# Patient Record
Sex: FEMALE | Race: ASIAN | Employment: UNEMPLOYED | URBAN - METROPOLITAN AREA
[De-identification: names, ages, dates, MRNs, and addresses within clinical notes are randomized per-mention and may not be internally consistent; named-entity substitution may affect disease eponyms.]

---

## 2022-05-20 LAB
ANTIBODY SCREEN, EXTERNAL: NEGATIVE
CHLAMYDIA, EXTERNAL: NEGATIVE
HBSAG, EXTERNAL: NEGATIVE
HEPATITIS C AB,   EXT: NEGATIVE
HIV, EXTERNAL: NORMAL
N. GONORRHEA, EXTERNAL: NEGATIVE
RUBELLA, EXTERNAL: NORMAL
TYPE, ABO & RH, EXTERNAL: NORMAL

## 2022-11-28 LAB — GRBS, EXTERNAL: NEGATIVE

## 2022-12-19 ENCOUNTER — HOSPITAL ENCOUNTER (INPATIENT)
Age: 32
LOS: 4 days | Discharge: HOME | End: 2022-12-23
Attending: OBSTETRICS & GYNECOLOGY | Admitting: STUDENT IN AN ORGANIZED HEALTH CARE EDUCATION/TRAINING PROGRAM
Payer: COMMERCIAL

## 2022-12-19 PROBLEM — Z34.90 PREGNANCY: Status: ACTIVE | Noted: 2022-12-19

## 2022-12-19 LAB
BASOPHILS # BLD: 0 K/UL (ref 0–0.1)
BASOPHILS NFR BLD: 0 % (ref 0–1)
DIFFERENTIAL METHOD BLD: ABNORMAL
EOSINOPHIL # BLD: 0.5 K/UL (ref 0–0.4)
EOSINOPHIL NFR BLD: 5 % (ref 0–7)
ERYTHROCYTE [DISTWIDTH] IN BLOOD BY AUTOMATED COUNT: 13.8 % (ref 11.5–14.5)
HCT VFR BLD AUTO: 35.5 % (ref 35–47)
HGB BLD-MCNC: 11.8 G/DL (ref 11.5–16)
IMM GRANULOCYTES # BLD AUTO: 0.1 K/UL (ref 0–0.04)
IMM GRANULOCYTES NFR BLD AUTO: 1 % (ref 0–0.5)
LYMPHOCYTES # BLD: 2.1 K/UL (ref 0.8–3.5)
LYMPHOCYTES NFR BLD: 20 % (ref 12–49)
MCH RBC QN AUTO: 30.1 PG (ref 26–34)
MCHC RBC AUTO-ENTMCNC: 33.2 G/DL (ref 30–36.5)
MCV RBC AUTO: 90.6 FL (ref 80–99)
MONOCYTES # BLD: 0.8 K/UL (ref 0–1)
MONOCYTES NFR BLD: 8 % (ref 5–13)
NEUTS SEG # BLD: 7.3 K/UL (ref 1.8–8)
NEUTS SEG NFR BLD: 66 % (ref 32–75)
NRBC # BLD: 0 K/UL (ref 0–0.01)
NRBC BLD-RTO: 0 PER 100 WBC
PLATELET # BLD AUTO: 210 K/UL (ref 150–400)
PMV BLD AUTO: 10.4 FL (ref 8.9–12.9)
RBC # BLD AUTO: 3.92 M/UL (ref 3.8–5.2)
WBC # BLD AUTO: 10.8 K/UL (ref 3.6–11)

## 2022-12-19 PROCEDURE — 85025 COMPLETE CBC W/AUTO DIFF WBC: CPT

## 2022-12-19 PROCEDURE — 3E033VJ INTRODUCTION OF OTHER HORMONE INTO PERIPHERAL VEIN, PERCUTANEOUS APPROACH: ICD-10-PCS | Performed by: STUDENT IN AN ORGANIZED HEALTH CARE EDUCATION/TRAINING PROGRAM

## 2022-12-19 PROCEDURE — 65270000029 HC RM PRIVATE

## 2022-12-19 PROCEDURE — 74011250637 HC RX REV CODE- 250/637: Performed by: STUDENT IN AN ORGANIZED HEALTH CARE EDUCATION/TRAINING PROGRAM

## 2022-12-19 PROCEDURE — 10H07YZ INSERTION OF OTHER DEVICE INTO PRODUCTS OF CONCEPTION, VIA NATURAL OR ARTIFICIAL OPENING: ICD-10-PCS | Performed by: STUDENT IN AN ORGANIZED HEALTH CARE EDUCATION/TRAINING PROGRAM

## 2022-12-19 PROCEDURE — 3E0DXGC INTRODUCTION OF OTHER THERAPEUTIC SUBSTANCE INTO MOUTH AND PHARYNX, EXTERNAL APPROACH: ICD-10-PCS | Performed by: STUDENT IN AN ORGANIZED HEALTH CARE EDUCATION/TRAINING PROGRAM

## 2022-12-19 PROCEDURE — 75410000002 HC LABOR FEE PER 1 HR

## 2022-12-19 RX ORDER — SODIUM CHLORIDE, SODIUM LACTATE, POTASSIUM CHLORIDE, CALCIUM CHLORIDE 600; 310; 30; 20 MG/100ML; MG/100ML; MG/100ML; MG/100ML
125 INJECTION, SOLUTION INTRAVENOUS CONTINUOUS
Status: DISCONTINUED | OUTPATIENT
Start: 2022-12-19 | End: 2022-12-23 | Stop reason: HOSPADM

## 2022-12-19 RX ORDER — OXYTOCIN/RINGER'S LACTATE 30/500 ML
10 PLASTIC BAG, INJECTION (ML) INTRAVENOUS AS NEEDED
Status: COMPLETED | OUTPATIENT
Start: 2022-12-19 | End: 2022-12-21

## 2022-12-19 RX ORDER — SODIUM CHLORIDE 0.9 % (FLUSH) 0.9 %
5-40 SYRINGE (ML) INJECTION AS NEEDED
Status: DISCONTINUED | OUTPATIENT
Start: 2022-12-19 | End: 2022-12-23 | Stop reason: HOSPADM

## 2022-12-19 RX ORDER — OXYTOCIN/RINGER'S LACTATE 30/500 ML
0-20 PLASTIC BAG, INJECTION (ML) INTRAVENOUS
Status: DISCONTINUED | OUTPATIENT
Start: 2022-12-20 | End: 2022-12-23 | Stop reason: HOSPADM

## 2022-12-19 RX ORDER — SODIUM CHLORIDE 0.9 % (FLUSH) 0.9 %
5-40 SYRINGE (ML) INJECTION EVERY 8 HOURS
Status: DISCONTINUED | OUTPATIENT
Start: 2022-12-19 | End: 2022-12-23 | Stop reason: HOSPADM

## 2022-12-19 RX ORDER — CALCIUM CARBONATE/VITAMIN D3 500 MG-10
TABLET ORAL
COMMUNITY

## 2022-12-19 RX ORDER — NALOXONE HYDROCHLORIDE 0.4 MG/ML
0.4 INJECTION, SOLUTION INTRAMUSCULAR; INTRAVENOUS; SUBCUTANEOUS AS NEEDED
Status: DISCONTINUED | OUTPATIENT
Start: 2022-12-19 | End: 2022-12-21 | Stop reason: HOSPADM

## 2022-12-19 RX ORDER — OXYTOCIN/RINGER'S LACTATE 30/500 ML
87.3 PLASTIC BAG, INJECTION (ML) INTRAVENOUS AS NEEDED
Status: COMPLETED | OUTPATIENT
Start: 2022-12-19 | End: 2022-12-21

## 2022-12-19 RX ADMIN — Medication 25 MCG: at 23:02

## 2022-12-19 NOTE — H&P
History & Physical    Name: Ronnell Vera MRN: [id number]  SSN: [id number]    YOB: 1990  Age: 28 y.o. Sex: female      Subjective:     Estimated Date of Delivery: None noted. OB History    Para Term  AB Living   1             SAB IAB Ectopic Molar Multiple Live Births                    # Outcome Date GA Lbr Lyndon/2nd Weight Sex Delivery Anes PTL Lv   1 Current              Ms. Viviana Mcfarlane is admitted with pregnancy at 39w1d for induction of labor due to oligohydramnios identified on ultrasound today with BRITTNEY of 4cm. Prenatal course complicated by:      M1NA - BG within range, EFW 85%ile at 38w   Oligohydramnios diagnosed today on ultrasound   Anemia Hgb 10.8 at 28w    Please see prenatal records for details. No past medical history on file. No past surgical history on file. Social History     Occupational History    Not on file   Tobacco Use    Smoking status: Not on file    Smokeless tobacco: Not on file   Substance and Sexual Activity    Alcohol use: Not on file    Drug use: Not on file    Sexual activity: Not on file     No family history on file. Not on File  Prior to Admission medications    Not on File        Review of Systems    Objective:     Vitals: There were no vitals filed for this visit. Physical Exam      Cervical Exam: /-2 in office today   Membranes: intact     Prenatal Labs:    B positive   HBS Ag negative   Rubella immune   RPR non reactive   Hep C neg   HIV non reactive   GC neg   CT neg   GBS neg     No results found for: ABORH, RUBELLAEXT, GRBSEXT, HBSAGEXT, HIVEXT, RPREXT, GONNOEXT, CHLAMEXT, ABORHEXT, RUBELLAEXT, GRBSEXT, HBSAGEXT, HIVEXT, RPREXT, GONNOEXT, CHLAMEXT       Impression/Plan:     Active Problems:    * No active hospital problems. *       Plan: Admit for induction of labor. Group B Strep negative.      IOL   - Casillas bulb +/- cytotec 25mcg q4hrs x 3 doses   - Pitocin ordered for 0600   - No abx indicated    A1DM   - BG checks q4/q2 Keyanna Frank MD

## 2022-12-20 ENCOUNTER — ANESTHESIA (OUTPATIENT)
Dept: LABOR AND DELIVERY | Age: 32
End: 2022-12-20
Payer: COMMERCIAL

## 2022-12-20 ENCOUNTER — ANESTHESIA EVENT (OUTPATIENT)
Dept: LABOR AND DELIVERY | Age: 32
End: 2022-12-20
Payer: COMMERCIAL

## 2022-12-20 LAB
GLUCOSE BLD STRIP.AUTO-MCNC: 106 MG/DL (ref 65–117)
GLUCOSE BLD STRIP.AUTO-MCNC: 89 MG/DL (ref 65–117)
GLUCOSE BLD STRIP.AUTO-MCNC: 91 MG/DL (ref 65–117)
SERVICE CMNT-IMP: NORMAL

## 2022-12-20 PROCEDURE — 74011250636 HC RX REV CODE- 250/636: Performed by: ADVANCED PRACTICE MIDWIFE

## 2022-12-20 PROCEDURE — 82962 GLUCOSE BLOOD TEST: CPT

## 2022-12-20 PROCEDURE — 74011250637 HC RX REV CODE- 250/637: Performed by: STUDENT IN AN ORGANIZED HEALTH CARE EDUCATION/TRAINING PROGRAM

## 2022-12-20 PROCEDURE — 00HU33Z INSERTION OF INFUSION DEVICE INTO SPINAL CANAL, PERCUTANEOUS APPROACH: ICD-10-PCS | Performed by: STUDENT IN AN ORGANIZED HEALTH CARE EDUCATION/TRAINING PROGRAM

## 2022-12-20 PROCEDURE — 65270000029 HC RM PRIVATE

## 2022-12-20 PROCEDURE — 74011000250 HC RX REV CODE- 250: Performed by: STUDENT IN AN ORGANIZED HEALTH CARE EDUCATION/TRAINING PROGRAM

## 2022-12-20 PROCEDURE — 74011000250 HC RX REV CODE- 250

## 2022-12-20 PROCEDURE — 77030014125 HC TY EPDRL BBMI -B: Performed by: STUDENT IN AN ORGANIZED HEALTH CARE EDUCATION/TRAINING PROGRAM

## 2022-12-20 PROCEDURE — 74011250636 HC RX REV CODE- 250/636: Performed by: STUDENT IN AN ORGANIZED HEALTH CARE EDUCATION/TRAINING PROGRAM

## 2022-12-20 PROCEDURE — 77030003666 HC NDL SPINAL BD -A: Performed by: STUDENT IN AN ORGANIZED HEALTH CARE EDUCATION/TRAINING PROGRAM

## 2022-12-20 PROCEDURE — 74011000258 HC RX REV CODE- 258: Performed by: ADVANCED PRACTICE MIDWIFE

## 2022-12-20 RX ORDER — NORETHINDRONE AND ETHINYL ESTRADIOL 0.5-0.035
12.5 KIT ORAL AS NEEDED
Status: DISCONTINUED | OUTPATIENT
Start: 2022-12-20 | End: 2022-12-21 | Stop reason: HOSPADM

## 2022-12-20 RX ORDER — NALBUPHINE HYDROCHLORIDE 10 MG/ML
5 INJECTION, SOLUTION INTRAMUSCULAR; INTRAVENOUS; SUBCUTANEOUS
Status: DISCONTINUED | OUTPATIENT
Start: 2022-12-20 | End: 2022-12-21 | Stop reason: HOSPADM

## 2022-12-20 RX ORDER — BUPIVACAINE HYDROCHLORIDE 2.5 MG/ML
INJECTION, SOLUTION EPIDURAL; INFILTRATION; INTRACAUDAL
Status: COMPLETED
Start: 2022-12-20 | End: 2022-12-20

## 2022-12-20 RX ORDER — BUPIVACAINE HYDROCHLORIDE 2.5 MG/ML
INJECTION, SOLUTION EPIDURAL; INFILTRATION; INTRACAUDAL AS NEEDED
Status: DISCONTINUED | OUTPATIENT
Start: 2022-12-20 | End: 2022-12-21 | Stop reason: HOSPADM

## 2022-12-20 RX ORDER — NORETHINDRONE AND ETHINYL ESTRADIOL 0.5-0.035
KIT ORAL
Status: COMPLETED
Start: 2022-12-20 | End: 2022-12-20

## 2022-12-20 RX ORDER — SODIUM CHLORIDE, SODIUM LACTATE, POTASSIUM CHLORIDE, CALCIUM CHLORIDE 600; 310; 30; 20 MG/100ML; MG/100ML; MG/100ML; MG/100ML
125 INJECTION, SOLUTION INTRAVENOUS CONTINUOUS
Status: DISCONTINUED | OUTPATIENT
Start: 2022-12-20 | End: 2022-12-21 | Stop reason: HOSPADM

## 2022-12-20 RX ORDER — BUPIVACAINE HYDROCHLORIDE 2.5 MG/ML
INJECTION, SOLUTION EPIDURAL; INFILTRATION; INTRACAUDAL
Status: COMPLETED | OUTPATIENT
Start: 2022-12-20 | End: 2022-12-20

## 2022-12-20 RX ORDER — NALOXONE HYDROCHLORIDE 0.4 MG/ML
0.4 INJECTION, SOLUTION INTRAMUSCULAR; INTRAVENOUS; SUBCUTANEOUS AS NEEDED
Status: DISCONTINUED | OUTPATIENT
Start: 2022-12-20 | End: 2022-12-21 | Stop reason: HOSPADM

## 2022-12-20 RX ORDER — FENTANYL CITRATE 50 UG/ML
INJECTION, SOLUTION INTRAMUSCULAR; INTRAVENOUS AS NEEDED
Status: DISCONTINUED | OUTPATIENT
Start: 2022-12-20 | End: 2022-12-21 | Stop reason: HOSPADM

## 2022-12-20 RX ORDER — FENTANYL CITRATE 50 UG/ML
INJECTION, SOLUTION INTRAMUSCULAR; INTRAVENOUS
Status: COMPLETED
Start: 2022-12-20 | End: 2022-12-20

## 2022-12-20 RX ORDER — FENTANYL/BUPIVACAINE/NS/PF 2-1250MCG
1-16 PREFILLED PUMP RESERVOIR EPIDURAL CONTINUOUS
Status: DISCONTINUED | OUTPATIENT
Start: 2022-12-20 | End: 2022-12-21 | Stop reason: HOSPADM

## 2022-12-20 RX ORDER — NALBUPHINE HYDROCHLORIDE 10 MG/ML
10 INJECTION, SOLUTION INTRAMUSCULAR; INTRAVENOUS; SUBCUTANEOUS
Status: DISCONTINUED | OUTPATIENT
Start: 2022-12-20 | End: 2022-12-23 | Stop reason: HOSPADM

## 2022-12-20 RX ORDER — LIDOCAINE HYDROCHLORIDE AND EPINEPHRINE 15; 5 MG/ML; UG/ML
INJECTION, SOLUTION EPIDURAL
Status: COMPLETED | OUTPATIENT
Start: 2022-12-20 | End: 2022-12-21

## 2022-12-20 RX ADMIN — SODIUM CHLORIDE, POTASSIUM CHLORIDE, SODIUM LACTATE AND CALCIUM CHLORIDE 125 ML/HR: 600; 310; 30; 20 INJECTION, SOLUTION INTRAVENOUS at 07:58

## 2022-12-20 RX ADMIN — BUPIVACAINE HYDROCHLORIDE 0.5 ML: 2.5 INJECTION, SOLUTION EPIDURAL; INFILTRATION; INTRACAUDAL at 19:35

## 2022-12-20 RX ADMIN — NALBUPHINE HYDROCHLORIDE 10 MG: 10 INJECTION, SOLUTION INTRAMUSCULAR; INTRAVENOUS; SUBCUTANEOUS at 02:22

## 2022-12-20 RX ADMIN — FENTANYL CITRATE 100 MCG: 50 INJECTION, SOLUTION INTRAMUSCULAR; INTRAVENOUS at 19:35

## 2022-12-20 RX ADMIN — PROMETHAZINE HYDROCHLORIDE 12.5 MG: 25 INJECTION INTRAMUSCULAR; INTRAVENOUS at 02:21

## 2022-12-20 RX ADMIN — LIDOCAINE HYDROCHLORIDE,EPINEPHRINE BITARTRATE 1 ML: 15; .005 INJECTION, SOLUTION EPIDURAL; INFILTRATION; INTRACAUDAL; PERINEURAL at 19:35

## 2022-12-20 RX ADMIN — EPHEDRINE SULFATE 12.5 MG: 50 INJECTION INTRAVENOUS at 20:33

## 2022-12-20 RX ADMIN — SODIUM CHLORIDE, POTASSIUM CHLORIDE, SODIUM LACTATE AND CALCIUM CHLORIDE 125 ML/HR: 600; 310; 30; 20 INJECTION, SOLUTION INTRAVENOUS at 13:33

## 2022-12-20 RX ADMIN — SODIUM CHLORIDE, POTASSIUM CHLORIDE, SODIUM LACTATE AND CALCIUM CHLORIDE 500 ML: 600; 310; 30; 20 INJECTION, SOLUTION INTRAVENOUS at 12:25

## 2022-12-20 RX ADMIN — OXYTOCIN 1 MILLI-UNITS/MIN: 10 INJECTION, SOLUTION INTRAMUSCULAR; INTRAVENOUS at 07:56

## 2022-12-20 RX ADMIN — Medication 25 MCG: at 03:13

## 2022-12-20 RX ADMIN — Medication 10 ML/HR: at 19:50

## 2022-12-20 RX ADMIN — SODIUM CHLORIDE, POTASSIUM CHLORIDE, SODIUM LACTATE AND CALCIUM CHLORIDE 999 ML/HR: 600; 310; 30; 20 INJECTION, SOLUTION INTRAVENOUS at 19:40

## 2022-12-20 RX ADMIN — BUPIVACAINE HYDROCHLORIDE 2 ML: 2.5 INJECTION, SOLUTION EPIDURAL; INFILTRATION; INTRACAUDAL; PERINEURAL at 19:35

## 2022-12-20 RX ADMIN — SODIUM CHLORIDE, POTASSIUM CHLORIDE, SODIUM LACTATE AND CALCIUM CHLORIDE 999 ML/HR: 600; 310; 30; 20 INJECTION, SOLUTION INTRAVENOUS at 22:20

## 2022-12-20 RX ADMIN — EPHEDRINE SULFATE 12.5 MG: 50 INJECTION INTRAVENOUS at 23:04

## 2022-12-20 NOTE — PROGRESS NOTES
2235: Calling STACI He to bedside     2239: STACI He at bedside discussing plan of care. 2250: Casillas balloon placement 60/60    2302: First dose cytotec given    0200: Pt asking questions about pain management, this RN discussing IV pain medication. Pt requesting medication at this time. 4528: Discussing care overnight with Dr. Jamari Yuan. Last dose of cytotec given at 0300 and pitocin had not been started yet. 4499: Dr. Jamari Yuan at bedside discussing plan of care. Pt given opportunity to ask questions and verbalized understanding of plan. 0745: Bedside and Verbal shift change report given to ROMAIN Russell (oncoming nurse) by Carmelina Rosenthal RN (offgoing nurse). Report included the following information SBAR, Kardex, Procedure Summary, Intake/Output, MAR, Accordion, and Recent Results.

## 2022-12-20 NOTE — PROGRESS NOTES
Labor Note    Pt seen and examined and electronic chart was reviewed. Roberto Clancy is well known to me and is a pleasant 29 y/o  with IUP at 39 2/7 wks undergoing IOL for oligohydramnios (BRITTNEY 4 in office yesterday). Pregnancy is c/b GDMA1. She had a cook placed at 11 pm last night. She received 2 doses of cytotec. Pitocin to be started now. Pt is feeling well. She did receive nubain/phenergan overnight. Jason Castillo still in place. FHTs category 1. Will remove cook at 11 am and plan cervical check then.

## 2022-12-20 NOTE — PROGRESS NOTES
Labor Progress Note  Patient seen, fetal heart rate and contraction pattern evaluated, patient examined. Rev POC for [facility] cath and Cytotec 25 mcg Buccal Q 4 hrs, will probably only receive 2 doses and then Start Pitocin  Visit Vitals  Ht 5' 4\" (1.626 m)   Wt 190 lb (86.2 kg)   BMI 32.61 kg/m²       Physical Exam:  Cervical Exam:  1 cm dilated    70% effaced    -1 station    Presenting Part: cephalic  Cervical Position: posterior  Consistency: Medium  Membranes:  Intact  Uterine Activity: Frequency: Every 7 minutes, Duration: 60-80 seconds, and Intensity: mild  Fetal Heart Rate: Baseline: 135 per minute, Cat I  Variability: moderate  Accelerations: yes  Decelerations: none    Assessment/Plan:  Reassuring fetal status, Continue plan for vaginal delivery  Cook placement  Cytotec 25 mcg X 2 buccal    Pt placed in Dorsi-lithotomy position on upside-down bedpan  Cook Catheter shown to both pt and partner  All question asked and answered  Verbal consent obtained for exam and placement of Cook Cath  Speculum inserted and,  Cook Catheter placed into cervix without difficulty  Both Uterine and Vaginal Balloons filled to 60 ml  Marcela well     ORLANDO Shultz/PHILIPPE

## 2022-12-20 NOTE — PROGRESS NOTES
0730 - Bedside shift change report given to Lucho Alexandra RN (oncoming nurse) by Sisi Vergara RN (offgoing nurse). Report included the following information SBAR.  Patient of Dr Eber Bass here for induction due to Oligo. Birth plan at bedside. 1040 - On portable monitor. Up to bathroom    1115 - Patient in bed, sleeping soundly    1150 - Patient still sleeping    1215 - Dr Eber Bass at bedside, removed richardson balloon, SVE 4-5/70/-2. AROM, scant clear fluid. RN encouraged ambulation and demonstrated birthing ball techninques. Patient up to bathroom    1224 - Pitocin decreased. 1225 - IV bolus    1245 - Patient up to bedside to eat lunch. RN adjusted monitors. 1255 - Patient still sitting up eating. 1330 - RN at beside, adjusted TOCO    1400 - Adjusted TOCO     1440 - Adjusted TOCO     1530- RN at bedside, adjusted TOCO, patient off tele monitor and on stationary monitor for now. 1540 - Bedside shift change report given to ROCHELLE Rosen RN (oncoming nurse) by Lucho Alexandra RN (offgoing nurse). Report included the following information SBAR.

## 2022-12-20 NOTE — PROGRESS NOTES
Labor Note    S: Now feeling contractions. O:  Visit Vitals  BP (!) 123/59   Pulse 84   Temp 98.9 °F (37.2 °C)   Resp 16   Ht 5' 4\" (1.626 m)   Wt 86.2 kg (190 lb)   SpO2 99%   BMI 32.61 kg/m²     Gen- NAD  Abdomen- soft, gravid, Nt  Cervix- 6/70/-2  AROM performed with scant clear fluid at 1215  FHTs- category 1  [place]- ctx q2-3 min, pit at 12 mU/min    A/P: 29 y/o  with IUP at 39 2/7 wks undergoing IOL for oligohydramnios (BRITTNEY 4 in office yesterday). Pregnancy is c/b GDMA1. GBS neg.   -Maternal well-being: VSS, q2hour accuchecks  -Fetal well-being: CEFM, cat 1 tracing  -Labor: s/p AROM, continue pit    Will reassess in 2-3 hours or prn.

## 2022-12-20 NOTE — PROGRESS NOTES
Labor Note    S: Pt has been sleeping most of the morning. Not really feeling ctx. O:  Visit Vitals  BP (!) 111/59   Pulse 86   Temp 99.4 °F (37.4 °C)   Resp 16   Ht 5' 4\" (1.626 m)   Wt 86.2 kg (190 lb)   SpO2 99%   BMI 32.61 kg/m²     Gen- NAD  Abdomen- soft, gravid, Nt  Cervix- Cook removed, 4-5/70/-2  AROM performed with scant clear fluid  FHTs- category 1  [place]- ctx q8 min, pit at 14 mU/min    A/P: 29 y/o  with IUP at 39 2/7 wks undergoing IOL for oligohydramnios (BRITTNEY 4 in office yesterday). Pregnancy is c/b GDMA1. GBS neg.   -Maternal well-being: VSS, q4hour accuchecks  -Fetal well-being: CEFM, cat 1 tracing  -Labor: good response to ripening, s/p AROM, continue pit    Will reassess in 2-3 hours or prn.

## 2022-12-21 LAB
GLUCOSE BLD STRIP.AUTO-MCNC: 87 MG/DL (ref 65–117)
GLUCOSE BLD STRIP.AUTO-MCNC: 92 MG/DL (ref 65–117)
GLUCOSE BLD STRIP.AUTO-MCNC: 97 MG/DL (ref 65–117)
SERVICE CMNT-IMP: NORMAL

## 2022-12-21 PROCEDURE — 0KQM0ZZ REPAIR PERINEUM MUSCLE, OPEN APPROACH: ICD-10-PCS | Performed by: OBSTETRICS & GYNECOLOGY

## 2022-12-21 PROCEDURE — 74011250637 HC RX REV CODE- 250/637: Performed by: OBSTETRICS & GYNECOLOGY

## 2022-12-21 PROCEDURE — 76060000078 HC EPIDURAL ANESTHESIA

## 2022-12-21 PROCEDURE — 74011000250 HC RX REV CODE- 250: Performed by: STUDENT IN AN ORGANIZED HEALTH CARE EDUCATION/TRAINING PROGRAM

## 2022-12-21 PROCEDURE — 74011250636 HC RX REV CODE- 250/636: Performed by: STUDENT IN AN ORGANIZED HEALTH CARE EDUCATION/TRAINING PROGRAM

## 2022-12-21 PROCEDURE — 82962 GLUCOSE BLOOD TEST: CPT

## 2022-12-21 PROCEDURE — 75410000003 HC RECOV DEL/VAG/CSECN EA 0.5 HR

## 2022-12-21 PROCEDURE — 74011250637 HC RX REV CODE- 250/637: Performed by: ADVANCED PRACTICE MIDWIFE

## 2022-12-21 PROCEDURE — 10907ZC DRAINAGE OF AMNIOTIC FLUID, THERAPEUTIC FROM PRODUCTS OF CONCEPTION, VIA NATURAL OR ARTIFICIAL OPENING: ICD-10-PCS | Performed by: OBSTETRICS & GYNECOLOGY

## 2022-12-21 PROCEDURE — 0UQKXZZ REPAIR HYMEN, EXTERNAL APPROACH: ICD-10-PCS | Performed by: OBSTETRICS & GYNECOLOGY

## 2022-12-21 PROCEDURE — 59200 INSERT CERVICAL DILATOR: CPT

## 2022-12-21 PROCEDURE — 65410000002 HC RM PRIVATE OB

## 2022-12-21 PROCEDURE — 75410000000 HC DELIVERY VAGINAL/SINGLE

## 2022-12-21 RX ORDER — BISACODYL 5 MG
5 TABLET, DELAYED RELEASE (ENTERIC COATED) ORAL DAILY PRN
Status: DISCONTINUED | OUTPATIENT
Start: 2022-12-21 | End: 2022-12-23 | Stop reason: HOSPADM

## 2022-12-21 RX ORDER — ONDANSETRON 4 MG/1
4 TABLET, ORALLY DISINTEGRATING ORAL
Status: DISCONTINUED | OUTPATIENT
Start: 2022-12-21 | End: 2022-12-23 | Stop reason: HOSPADM

## 2022-12-21 RX ORDER — SIMETHICONE 80 MG
80 TABLET,CHEWABLE ORAL
Status: DISCONTINUED | OUTPATIENT
Start: 2022-12-21 | End: 2022-12-23 | Stop reason: HOSPADM

## 2022-12-21 RX ORDER — CALCIUM CARBONATE 200(500)MG
1000 TABLET,CHEWABLE ORAL
Status: COMPLETED | OUTPATIENT
Start: 2022-12-21 | End: 2022-12-21

## 2022-12-21 RX ORDER — DIPHENHYDRAMINE HCL 25 MG
25 CAPSULE ORAL
Status: DISCONTINUED | OUTPATIENT
Start: 2022-12-21 | End: 2022-12-23 | Stop reason: HOSPADM

## 2022-12-21 RX ORDER — OXYTOCIN/RINGER'S LACTATE 30/500 ML
10 PLASTIC BAG, INJECTION (ML) INTRAVENOUS AS NEEDED
Status: DISCONTINUED | OUTPATIENT
Start: 2022-12-21 | End: 2022-12-23 | Stop reason: HOSPADM

## 2022-12-21 RX ORDER — ACETAMINOPHEN 325 MG/1
650 TABLET ORAL
Status: DISCONTINUED | OUTPATIENT
Start: 2022-12-21 | End: 2022-12-23 | Stop reason: HOSPADM

## 2022-12-21 RX ORDER — HYDROCORTISONE ACETATE PRAMOXINE HCL 2.5; 1 G/100G; G/100G
CREAM TOPICAL AS NEEDED
Status: DISCONTINUED | OUTPATIENT
Start: 2022-12-21 | End: 2022-12-23 | Stop reason: HOSPADM

## 2022-12-21 RX ORDER — OXYCODONE AND ACETAMINOPHEN 5; 325 MG/1; MG/1
1 TABLET ORAL
Status: DISCONTINUED | OUTPATIENT
Start: 2022-12-21 | End: 2022-12-23 | Stop reason: HOSPADM

## 2022-12-21 RX ORDER — IBUPROFEN 400 MG/1
800 TABLET ORAL EVERY 8 HOURS
Status: DISCONTINUED | OUTPATIENT
Start: 2022-12-21 | End: 2022-12-21

## 2022-12-21 RX ORDER — IBUPROFEN 400 MG/1
800 TABLET ORAL EVERY 8 HOURS
Status: DISCONTINUED | OUTPATIENT
Start: 2022-12-21 | End: 2022-12-23 | Stop reason: HOSPADM

## 2022-12-21 RX ORDER — OXYTOCIN/RINGER'S LACTATE 30/500 ML
87.3 PLASTIC BAG, INJECTION (ML) INTRAVENOUS AS NEEDED
Status: DISCONTINUED | OUTPATIENT
Start: 2022-12-21 | End: 2022-12-23 | Stop reason: HOSPADM

## 2022-12-21 RX ORDER — NALOXONE HYDROCHLORIDE 0.4 MG/ML
0.4 INJECTION, SOLUTION INTRAMUSCULAR; INTRAVENOUS; SUBCUTANEOUS AS NEEDED
Status: DISCONTINUED | OUTPATIENT
Start: 2022-12-21 | End: 2022-12-23 | Stop reason: HOSPADM

## 2022-12-21 RX ORDER — LIDOCAINE HYDROCHLORIDE AND EPINEPHRINE 15; 5 MG/ML; UG/ML
INJECTION, SOLUTION EPIDURAL
Status: COMPLETED
Start: 2022-12-21 | End: 2022-12-21

## 2022-12-21 RX ORDER — FENTANYL CITRATE 50 UG/ML
INJECTION, SOLUTION INTRAMUSCULAR; INTRAVENOUS
Status: COMPLETED
Start: 2022-12-21 | End: 2022-12-21

## 2022-12-21 RX ADMIN — FENTANYL CITRATE 100 MCG: 50 INJECTION, SOLUTION INTRAMUSCULAR; INTRAVENOUS at 10:50

## 2022-12-21 RX ADMIN — CALCIUM CARBONATE (ANTACID) CHEW TAB 500 MG 1000 MG: 500 CHEW TAB at 02:30

## 2022-12-21 RX ADMIN — ACETAMINOPHEN 650 MG: 325 TABLET ORAL at 04:28

## 2022-12-21 RX ADMIN — Medication 10 ML/HR: at 04:00

## 2022-12-21 RX ADMIN — IBUPROFEN 800 MG: 400 TABLET, FILM COATED ORAL at 13:42

## 2022-12-21 RX ADMIN — IBUPROFEN 800 MG: 400 TABLET, FILM COATED ORAL at 22:35

## 2022-12-21 RX ADMIN — OXYTOCIN 10000 MILLI-UNITS: 10 INJECTION, SOLUTION INTRAMUSCULAR; INTRAVENOUS at 12:28

## 2022-12-21 RX ADMIN — SODIUM CHLORIDE, POTASSIUM CHLORIDE, SODIUM LACTATE AND CALCIUM CHLORIDE 500 ML: 600; 310; 30; 20 INJECTION, SOLUTION INTRAVENOUS at 04:06

## 2022-12-21 RX ADMIN — OXYTOCIN 87.3 MILLI-UNITS/MIN: 10 INJECTION, SOLUTION INTRAMUSCULAR; INTRAVENOUS at 12:43

## 2022-12-21 RX ADMIN — LIDOCAINE HYDROCHLORIDE,EPINEPHRINE BITARTRATE 4.5 ML: 15; .005 INJECTION, SOLUTION EPIDURAL; INFILTRATION; INTRACAUDAL; PERINEURAL at 10:53

## 2022-12-21 RX ADMIN — OXYTOCIN 1 MILLI-UNITS/MIN: 10 INJECTION, SOLUTION INTRAMUSCULAR; INTRAVENOUS at 04:03

## 2022-12-21 RX ADMIN — OXYTOCIN 12 MILLI-UNITS/MIN: 10 INJECTION, SOLUTION INTRAMUSCULAR; INTRAVENOUS at 09:50

## 2022-12-21 NOTE — PROGRESS NOTES
1540 Bedside report received from [place] Dr. Misty Lopez at bedside. SVE 6/70/-2.    8642 Patient requesting Dr. Misty Lopez at [facility]. SVE 6/80/-1. Plan to get epidural.    1923 Dr. Og Vaughn at bedside for epidural placement. 1930 Bedside report given to Armida Patiño RN.

## 2022-12-21 NOTE — PROGRESS NOTES
Labor Note    S: Now feeling very painful ctx. Desires epidural.     O:  Visit Vitals  BP (!) 123/59   Pulse 84   Temp (P) 99.1 °F (37.3 °C)   Resp (P) 14   Ht 5' 4\" (1.626 m)   Wt 86.2 kg (190 lb)   SpO2 99%   BMI 32.61 kg/m²     Gen- NAD  Abdomen- soft, gravid, Nt  Cervix- 6/80/-2  AROM performed with scant clear fluid at 1215  FHTs- category 1  [place]- ctx q2-3 min, pit at 12 mU/min    A/P: 29 y/o  with IUP at 39 2/7 wks undergoing IOL for oligohydramnios (BRITTNEY 4 in office yesterday). Pregnancy is c/b GDMA1. GBS neg.   -Maternal well-being: VSS, q2hour accuchecks  -Fetal well-being: CEFM, cat 1 tracing  -Labor: s/p AROM, continue pit    Will reassess in 2-3 hours or prn.

## 2022-12-21 NOTE — PROGRESS NOTES
2340 Report received from [address]    12/21/22  0146 Update given on sve, contraction pattern and tempt. Order pitocin break for 2 hours    0151 in room talking with pt. Discussing plan of care. IUPC inserted and pitocin off.    0406 given fluid bolus for fetal tachycardia and tylenol for temp. 0530 STACI Berman notified of  late decels pitocin at 4mu will  be out  to soon. 7666 used epidural pca for bolus c/o feeling cramping in low abdomen. 36 STACI Berman at bedside strip viewed sve done. 9522 Bedside shift change report given to LEI Stephens (oncoming nurse) by Judge Gaston (offgoing nurse). Report included the following information SBAR, Procedure Summary, Intake/Output, MAR, Recent Results, and Med Rec Status.

## 2022-12-21 NOTE — PROGRESS NOTES
S: feeling optomistic    O:   Visit Vitals  BP (!) 92/50   Pulse 74   Temp 98.2 °F (36.8 °C)   Resp 16   Ht 5' 4\" (1.626 m)   Wt 86.2 kg (190 lb)   SpO2 100%   BMI 32.61 kg/m²       Gen: alert and oriented X3   Resp:nonlabored respirations  Cardiac: normal peripheral perfusion  Abdomen: soft, nontender, nondistended. Gravid  Ext: no pitting edema  SVE left lateral cervical lip/C/+1  Pit 8    Patient Vitals for the past 4 hrs: Mode Fetal Heart Rate Fetal Activity Variability Decelerations Accelerations RN Reviewed Strip?   22 0723 External 145 Present 6-25 BPM (!) Late No Yes   22 0657 External 140 Present 6-25 BPM Variable No Yes   22 0630 External 150 Present 6-25 BPM None No Yes   22 0559 External 150 Present 6-25 BPM Variable No Yes   22 0531 External 150 Present 6-25 BPM (!) Variable;Late No Yes     A/P G1 IOL for oligo, GDMA1 now 9-10cm dilated    - will give 2 more hours to allow for complete dilation, then start pushing  - closely monitor as there have been intermittent lates, none currently. - was protracted active phase, now almost entering second phase.      Dispo: expectant     Yonis Dozier MD

## 2022-12-21 NOTE — L&D DELIVERY NOTE
Delivery Summary    Patient: Anai Knott MRN: [id number]  SSN: [id number]    YOB: 1990  Age: 28 y.o. Sex: female       Called into room as patient was C/C/+2. She pushed for approximately 30 minutes before delivery of the infants head in the YUSRA position. There was a nuchal cord present which was  reduced. With gentle downward traction the anterior shoulder did not deliver after 10s and turtle sign noted. A dystocia was called. Quang maneuver performed with no relief of anterior (right) shoulder. Next the posterior arm was delivered, then with immediate delivery of the baby. Total time of dystocia 10s. The infant was immediately placed on the maternal abdomen with spontaneous cry after vigorous stimulation by RN. The cord was doubly clamped after cessation of pulsation and cut by the father of the baby. Gentle downward traction was placed on the umbilical cord with delivery of the placenta within  5 minutes. Due to increased bleeding, pitocin was started immediately and she was straight cathed for 50ccs. The fundus was then massaged and was firm. Inspection of the perineum revealed a second laceration which was repaired with a  3-0 vicryl in a standard running fashion. Additional stitch was placed on 9 oclock on hymen for small tear without hemostasis. Hemostasis was excellent. Information for the patient's :  Sri Balderas [[id number]]     Labor Events:    Labor: No    Steroids: None   Cervical Ripening Date/Time: 2022 10:50 PM   Cervical Ripening Type: Casillas/EASI; Misoprostol   Antibiotics During Labor: No   Rupture Identifier:      Rupture Date/Time: 2022 12:15 PM   Rupture Type: AROM   Amniotic Fluid Volume:      Amniotic Fluid Description: Clear    Amniotic Fluid Odor: None    Induction: Oxytocin       Induction Date/Time: 2022 7:56 AM    Indications for Induction: Other(comment)    Augmentation:     Augmentation Date/Time: Indications for Augmentation:     Labor complications: Additional complications:        Delivery Events:  Indications For Episiotomy:     Episiotomy:     Perineal Laceration(s):     Repaired:     Periurethral Laceration Location:      Repaired:     Labial Laceration Location:     Repaired:     Sulcal Laceration Location:     Repaired:     Vaginal Laceration Location:     Repaired:     Cervical Laceration Location:     Repaired:     Repair Suture:     Number of Repair Packets:     Estimated Blood Loss (ml):  ml   Quantitative Blood Loss (ml)                Delivery Date: 2022    Delivery Time: 12:22 PM  Delivery Type: Vaginal, Spontaneous  Sex:  Male    Gestational Age: 38w3d   Delivery Clinician:     Living Status:     Delivery Location:              APGARS  One minute Five minutes Ten minutes   Skin color:            Heart rate:            Grimace:            Muscle tone:            Breathing: Totals:                Presentation: Vertex    Position: Left Occiput Anterior  Resuscitation Method:        Meconium Stained:        Cord Information:    Complications:    Cord around:    Delayed cord clamping? Cord clamped date/time:   Disposition of Cord Blood:      Blood Gases Sent?:      Placenta:  Date/Time:    Removal:        Appearance:        Measurements:  Birth Weight:        Birth Length:        Head Circumference:        Chest Circumference:       Abdominal Girth:       Other Providers:    , Obstetrician;Primary Nurse;Primary  Nurse;Nicu Nurse;Neonatologist;Anesthesiologist;Crna;Nurse Practitioner;Midwife;Nursery Nurse         Group B Strep:   Lab Results   Component Value Date/Time    Jose, External Negative 2022 12:00 AM     Information for the patient's :  Misty Carrasquillo [[id number]]   No results found for: ABORH, PCTABR, PCTDIG, BILI, ABORHEXT, ABORH   No results for input(s): PCO2CB, PO2CB, HCO3I, SO2I, IBD, PTEMPI, SPECTI, PHICB, ISITE, IDEV, Vishal Mathis in the last 72 hours.      ,   Information for the patient's :  Mabel Hale [[id number]]   Shoulder Dystocia Details:       Affected Side: right      Date and Time Recognized:        Help Called By:   at        Physician/Provider:   arrived at        NICU Staff:   arrived at        Additional Staff Arrived:   at        Gentle Attempt at Traction:     If no, why:        First Maneuver:   at   by        [facility]:   at   by        Third Maneuver:   at   by

## 2022-12-21 NOTE — PROGRESS NOTES
0740 Bedside shift change report given to LEI Haque RN (oncoming nurse) by LILIANE Haque RN (offgoing nurse). Report included the following information SBAR, Kardex, and MAR.     0800 Dr John Jacobo at pt bedside. Discussing plan of care as pt has not had cervical change in over 12 hours. C/S discussed. Pt and  would like time to discuss before we do another cervical exam.    0830 Dr John Jacobo back at bedside, cervical exam is lip/100/+1. Plan is to proceed with a vaginal delivery. Fetal tracing is reactive and reassuring. 6679 pt placed in throne position. 1 Dr Lilia Duenas at bedside for epidural redose    [id number]  of baby boy. Shoulder dystocia noted but quickly resolved with suprapubic pressure and reduction of posterior arm.

## 2022-12-21 NOTE — PROGRESS NOTES
Labor Progress Note  Patient seen, fetal heart rate and contraction pattern evaluated, patient examined. Resting on side with peanut ball  S/P Epidural and comfortable, denies pain, pressure or urge to push  Pitocin @ 16 millu/min    Visit Vitals  BP (!) 100/55   Pulse 93   Temp 99 °F (37.2 °C)   Resp 14   Ht 5' 4\" (1.626 m)   Wt 190 lb (86.2 kg)   SpO2 99%   BMI 32.61 kg/m²       Physical Exam:  Cervical Exam:  Deferred  Membranes:   Leaking Clear Fluid  Uterine Activity: Frequency: Every 2 minutes, Duration: 50-60 seconds, and Intensity: strong  Fetal Heart Rate: Baseline: 140 per minute  Variability: moderate  Accelerations: yes  Decelerations: early    Assessment/Plan:  Reassuring fetal status, Labor  Progressing normally, Continue plan for vaginal delivery  Titrate pitocin prn  Recheck cx with pressure/urge to push  Anticipate     Lesly Motley, ORLANDO/PHILIPPE

## 2022-12-21 NOTE — ANESTHESIA PROCEDURE NOTES
CSE Block    Start time: 12/20/2022 7:25 PM  End time: 12/20/2022 7:35 PM  Performed by: Cortes Valentin DO  Authorized by: Cortes Valentin DO     Pre-Procedure  Indications: primary anesthetic    preanesthetic checklist: patient identified, risks and benefits discussed, anesthesia consent, site marked, patient being monitored, timeout performed and fire risk safety assessment completed and verbalized    Timeout Time: 19:25 EST      Procedure:   Patient Position:  Seated  Prep Region:  Lumbar  Prep: DuraPrep    Location:  L3-4    Epidural Needle:   Needle Type:  Tuohy  Needle Gauge:  17 G  Injection Technique:  Loss of resistance using air  Attempts:  1    Spinal Needle:   Needle Type:  Pencil-tip  Needle Gauge:  25 G    Catheter:   Catheter Type:  Standard  Catheter Size:  19 G  Catheter at Skin Depth (cm):  9  Depth in Epidural Space (cm):  5  Events: no blood with aspiration, no cerebrospinal fluid with aspiration, no paresthesia, negative aspiration test and CSF confirmed    Test Dose:  Bupivacaine (PF) (MARCAINE) 0.25% Epidural - Epidural   2 mL - 12/20/2022 7:35:00 PM  lidocaine-EPINEPHrine (XYLOCAINE) 1.5 %-1:200,000 injection Epidural - Epidural   1 mL - 12/20/2022 7:35:00 PM  Med Admin time: 12/20/2022 7:35 PM    Assessment:   Catheter Secured:  Tegaderm and tape  Insertion:  Uncomplicated  Patient tolerance:  Patient tolerated the procedure well with no immediate complications

## 2022-12-21 NOTE — PROGRESS NOTES
Labor Progress Note  Patient seen, fetal heart rate and contraction pattern evaluated, patient examined. Recent exam by RN reports minimal to no cervical change  Has been @ 20 millu/min of Pitocin for several hours  Denies pain, pressure, urge to push  Visit Vitals  BP (!) 95/51   Pulse 82   Temp 98.7 °F (37.1 °C)   Resp 16   Ht 5' 4\" (1.626 m)   Wt 190 lb (86.2 kg)   SpO2 99%   BMI 32.61 kg/m²       Physical Exam:  Cervical Exam:  6-7 cm dilated    90% effaced    -1 station    Presenting Part: cephalic  Cervical Position: mid position  Consistency: Soft  Membranes:   Clear fluid  Uterine Activity: Frequency: Every 2-5 minutes, Duration: 60 seconds, and Intensity: strong  Fetal Heart Rate: Baseline: 140 per minute  Variability: moderate  Accelerations: yes  Decelerations: late and early    Assessment/Plan:  Discussed POC, Pt and spouse agree  IUPC placed without difficulty  Pitocin off x 2 hrs, restart @ 0400 and titrate safely to MVUs 240  TUMS po now  Frequent position changes  FC insert  Close observation  Savanna Prabhakar, WHNP/CNM

## 2022-12-21 NOTE — LACTATION NOTE
This note was copied from a baby's chart. Initial Lactation Consultation - Baby born vaginally this afternoon to a  mom at 44 3/7 weeks gestation. Mom has a history of diet controlled gestational diabetes. Mom states she attempted to breast feed in labor and delivery. I helped mom with a feeding this evening. He would not latch to the breast. I tried a nipple shield and he wouldn't latch to that either. I helped mom with hand expression and I gave him 1 cc    Mom will continue to offer the breast at least every 3 hours and will hand express if she is not able to get him to latch.

## 2022-12-21 NOTE — PROGRESS NOTES
1930: Bedside and Verbal shift change report given to KELSEY Mak, RN (oncoming nurse) by ABRAN Rosen RN (offgoing nurse). Report included the following information SBAR, Kardex, Procedure Summary, Intake/Output, MAR, Accordion, and Recent Results. 1940: Pt placed in left side lying position    1954: Pt turned to right side with peanut ball in place. 2006: Dr. Jose Alfredo Campos at bedside, verbal orders to titrate pitocin at this time    2033: Ephedrine given    2106: Pt turned to left side-lying position with peanut ball in place. 2205: Straight cath performed. 500mL urine drained    2214: Pt placed in exaggerated runners; right side-lying position    2304: Ephedrine given    2310: Pt placed in exaggerated runners; left side-lying position    2335: Bedside and Verbal shift change report given to LILIANE Cool (oncoming nurse) by Femi Fox RN (offgoing nurse). Report included the following information SBAR, Kardex, Procedure Summary, Intake/Output, MAR, Accordion, and Recent Results.

## 2022-12-21 NOTE — PROGRESS NOTES
Labor Progress Note  Patient seen, fetal heart rate and contraction pattern evaluated, patient examined. Reported mild pain on Right side, had been laying on that side for a while d/t late decels reported by RN  Here to eval FHR/SVE  Pitocin restarted @ 0500, currently @ 4 millu/min    Visit Vitals  BP (!) 96/47   Pulse (!) 101   Temp 99.8 °F (37.7 °C)   Resp 16   Ht 5' 4\" (1.626 m)   Wt 190 lb (86.2 kg)   SpO2 98%   BMI 32.61 kg/m²       Physical Exam:  Cervical Exam:  7 cm dilated    100% effaced    0 -+1station    Presenting Part: cephalic  Cervical Position: mid position  Consistency: Soft  Membranes:   Clear fluid  Uterine Activity: Frequency: Every 4-5minutes, Duration: 60 seconds, and Intensity: strong (100-120 mmgh)   Fetal Heart Rate: Baseline: 145 per minute  Variability: moderate  Accelerations: yes  Decelerations: variable, late, and early    Assessment/Plan:  Reassuring fetal status, Labor  Not progressing normally  continue pitocin augmentation  titrating dosage safely, Continue plan for vaginal delivery  Reassuring FHR Status scalp stim with exam elicits [id number] accel  Late decels interspersed with earlies and variables  With vtx now 0-+1 will try T-Masood with exaggerated side-lying  Close observation, recheck in 2 hrs or sooner with fetal indications    ORLANDO Escalante/PHILIPPE

## 2022-12-21 NOTE — PROGRESS NOTES
TRANSFER - IN REPORT:    Verbal report received from LEI Haque RN (name) on Daily Conner  being received from L&D(unit) for routine progression of care      Report consisted of patients Situation, Background, Assessment and   Recommendations(SBAR). Information from the following report(s) SBAR was reviewed with the receiving nurse. Opportunity for questions and clarification was provided. Assessment completed upon patients arrival to unit and care assumed.

## 2022-12-22 PROCEDURE — 74011250637 HC RX REV CODE- 250/637: Performed by: OBSTETRICS & GYNECOLOGY

## 2022-12-22 PROCEDURE — 65410000002 HC RM PRIVATE OB

## 2022-12-22 RX ORDER — IBUPROFEN 600 MG/1
600 TABLET ORAL
Qty: 30 TABLET | Refills: 1 | Status: SHIPPED | OUTPATIENT
Start: 2022-12-22

## 2022-12-22 RX ADMIN — IBUPROFEN 800 MG: 400 TABLET, FILM COATED ORAL at 21:58

## 2022-12-22 RX ADMIN — BISACODYL 5 MG: 5 TABLET, COATED ORAL at 00:07

## 2022-12-22 RX ADMIN — IBUPROFEN 800 MG: 400 TABLET, FILM COATED ORAL at 13:31

## 2022-12-22 RX ADMIN — BISACODYL 5 MG: 5 TABLET, COATED ORAL at 21:58

## 2022-12-22 NOTE — PROGRESS NOTES
Bedside and Verbal shift change report given to GRACIE Ortiz (oncoming nurse) by TOBI Adame (offgoing nurse). Report included the following information SBAR.

## 2022-12-22 NOTE — LACTATION NOTE
This note was copied from a baby's chart. Mom states she has been using the nipple shield to get baby to latch. She said it feels like the baby is biting when he is nursing. We reviewed positioning the baby at the breast and how mom can help baby get a deep latch. We were able to get baby latched deeply. He was sucking rhythmically with some swallows noted. She will continue to feed the baby according to his feeding cues or at least every 3 hours. William Nunez    I called and they are faxing some questions to fill out in lieu of doing a phone peer to peer  I will fill this out and send it out ASAP, try to do it before my vacation next week  If you want to discuss the plan for this patient I will email you my number and we can figure out a time to talk  Thanks!

## 2022-12-22 NOTE — DISCHARGE SUMMARY
Obstetrical Discharge Summary     Name: Giselle Merchant MRN: [id number]  SSN: [id number]    YOB: 1990  Age: 28 y.o. Sex: female      Admit Date: 2022    Discharge Date: 2022    Admitting Physician: Rajendra Dillon MD     Attending Physician:  Laura Yadav MD     Admission Diagnoses: Pregnancy [Z34.90]    Discharge Diagnoses:   Information for the patient's :  Misty Carrasquillo [[id number]]   Delivery of a 3.83 kg male infant via Vaginal, Spontaneous on 2022 at 12:22 PM  by Edna Cheung. Apgars were 8  and 9 . Additional Diagnoses:   Hospital Problems  Never Reviewed            Codes Class Noted POA    Pregnancy ICD-10-CM: Z34.90  ICD-9-CM: V22.2  2022 Unknown          Lab Results   Component Value Date/Time    Rubella, External Immune 2022 12:00 AM    GrBStrep, External Negative 2022 12:00 AM       Hospital Course: Normal hospital course following the delivery. Patient Instructions:   Current Discharge Medication List        START taking these medications    Details   ibuprofen (MOTRIN) 600 mg tablet Take 1 Tablet by mouth every six (6) hours as needed for Pain. Qty: 30 Tablet, Refills: 1           CONTINUE these medications which have NOT CHANGED    Details   Calcium-Cholecalciferol, D3, 500 mg-10 mcg (400 unit) tab Take  by mouth. Iron BisGl &PS [id number] 522-41-82-0 mg-mg-mcg-mg cap Take  by mouth. PNV Comb #2-Iron-FA-Omega 3 29-1-400 mg cmpk Take  by mouth. Disposition at Discharge: Home or self care    Condition at Discharge: Stable    Reference my discharge instructions.     Follow-up Appointments   Procedures    FOLLOW UP VISIT Appointment in: 6 Weeks     Standing Status:   Standing     Number of Occurrences:   1     Order Specific Question:   Appointment in     Answer:   6 Weeks        Signed By:  Kamar Rodriguez MD     2022

## 2022-12-22 NOTE — ROUTINE PROCESS
Verbal shift change report given to ARAM Felton RN (oncoming nurse) by Sharif Mccormick RN (offgoing nurse). Report included the following information SBAR, Intake/Output, MAR, and Recent Results.

## 2022-12-22 NOTE — PROGRESS NOTES
Post-Partum Day Number 1 Progress Note    Yolis Lockett     Assessment: Doing well, post partum day 1    Plan:  - Continue routine postpartum and perineal care as well as maternal education.  - No circ.  - GDMA1 - needs 2hr GTT at [facility] visit. - Plan discharge home [id number] Discharge Date: Tomorrow. Information for the patient's :  Porfirio Hernandez [[id number]]   Vaginal, Spontaneous  Patient doing well without significant complaint. Voiding without difficulty, normal lochia. Vitals:  Visit Vitals  /66 (BP 1 Location: Right upper arm, BP Patient Position: At rest;Sitting)   Pulse 87   Temp 97.8 °F (36.6 °C)   Resp 16   Ht 5' 4\" (1.626 m)   Wt 86.2 kg (190 lb)   SpO2 98%   Breastfeeding Unknown   BMI 32.61 kg/m²     Temp (24hrs), Av.4 °F (36.9 °C), Min:97.7 °F (36.5 °C), Max:100.3 °F (37.9 °C)        Exam:   Patient without distress. Fundus firm, nontender per nursing fundal checks. Perineum with normal lochia noted per nursing assessment. Lower extremities are negative for pathological edema. Labs:     Lab Results   Component Value Date/Time    WBC 10.8 2022 10:20 PM    HGB 11.8 2022 10:20 PM    HCT 35.5 2022 10:20 PM    PLATELET 332  10:20 PM       No results found for this or any previous visit (from the past 24 hour(s)).

## 2022-12-22 NOTE — DISCHARGE INSTRUCTIONS
POSTPARTUM DISCHARGE INSTRUCTIONS       Name:  Brown Gray  YOB: 1990  Admission Diagnosis:  Pregnancy [Z34.90]     Discharge Diagnosis:  [unfilled]  Attending Physician:  [unfilled]    Delivery Type:  Vaginal Childbirth with Episiotomy, Laceration or Tear: What To Expect At [address] [place] [address] body will slowly heal in the next few weeks. It is easy to get too tired and overwhelmed during the first weeks after your baby is born. Changes in your hormones can shift your mood without warning. You may find it hard to meet the extra demands on your energy and time. Take it easy on yourself. Follow-up care is a key part of your treatment and safety. Be sure to make and go to all appointments, and call your doctor if you are having problems. It's also a good idea to know your test results and keep a list of the medicines you take. How can you care for yourself at home? Vaginal Bleeding and Cramps  After delivery, you will have a bloody discharge from the vagina. This will turn pink within a week and then white or yellow after about 10 days. It may last for 2 to 4 weeks or longer, until the uterus has healed. Use pads instead of tampons until you stop bleeding. Do not worry if you pass some blood clots, as long as they are smaller than a golf ball. If you have a tear or stitches in your vaginal area, change the pad at least every 4 hours to prevent soreness and infection. You may have cramps for the first few days after childbirth. These are normal and occur as the uterus shrinks to normal size. Take an over-the-counter pain medicine, such as acetaminophen (Tylenol), ibuprofen (Advil, Motrin), or naproxen (Aleve), for cramps. Read and follow all instructions on the label. Do not take aspirin, because it can cause more bleeding. Do not take acetaminophen (Tylenol) and other acetaminophen containing medications (i.e. Percocet) at the same time.      Episiotomy, Lacerations or Tears  If you have stitches, they will dissolve on their own and do not need to be removed. Put ice or a cold pack on your painful area for 10 to 20 minutes at a time, several times a day, for the first few days. Put a thin cloth between the ice and your skin. Sit in a few inches of warm water (sitz bath) 3 times a day and after bowel movements. The warm water helps with pain and itching. If you do not have a tub, a warm shower might help. Breast fullness  Your breasts may overfill (engorge) in the first few days after delivery. To help milk flow and to relieve pain, warm your breasts in the shower or by using warm, moist towels before nursing. If you are not nursing, do not put warmth on your breasts or touch your breasts. Wear a tight bra or sports bra and use ice until the fullness goes away. This usually takes 2 to 3 days. Put ice or a cold pack on your breast after nursing to reduce swelling and pain. Put a thin cloth between the ice and your skin. Activity  Eat a balanced diet. Do not try to lose weight by cutting calories. Keep taking your prenatal vitamins, or take a multivitamin. Get as much rest as you can. Try to take naps when your baby sleeps during the day. Get some exercise every day. But do not do any heavy exercise until your doctor says it is okay. Wait until you are healed (about 4 to 6 weeks) before you have sexual intercourse. Your doctor will tell you when it is okay to have sex. Talk to your doctor about birth control. You can get pregnant even before your period returns. Also, you can get pregnant while you are breast-feeding. Mental Health  Many women get the \"baby blues\" during the first few days after childbirth. You may lose sleep, feel irritable, and cry easily. You may feel happy one minute and sad the next. Hormone changes are one cause of these emotional changes. Also, the demands of a new baby, along with visits from relatives or other family needs, add to a mother's stress.  The \"baby blues\" often peak around the fourth day. Then they ease up in less than 2 weeks. If your moodiness or anxiety lasts for more than 2 weeks, or if you feel like life is not worth living, you may have postpartum depression. This is different for each mother. Some mothers with serious depression may worry intensely about their infant's well-being. Others may feel distant from their child. Some mothers might even feel that they might harm their baby. A mother may have signs of paranoia, wondering if someone is watching her. With all the changes in your life, you may not know if you are depressed. Pregnancy sometimes causes changes in how you feel that are similar to the symptoms of depression. Symptoms of depression include:  Feeling sad or hopeless and losing interest in daily activities. These are the most common symptoms of depression. Sleeping too much or not enough. Feeling tired. You may feel as if you have no energy. Eating too much or too little. POSTPARTUM SUPPORT INTERNATIONAL (PSI) offers a Warm line; Chat with the Expert phone sessions; Information and Articles about Pregnancy and Postpartum Mood Disorders; Comprehensive List of Free Support Groups; Knowledgeable local coordinators who will offer support, information, and resources; Guide to Resources on [employer]; Calendar of events in the  mood disorders community; Latest News and Research; and [facility] [address] for [employer]. Remember - You are not alone; You are not to blame; With help, you will be well. 1-057-490-PPD(6189). WWW. POSTPARTUM. NET    Writing or talking about death, such as writing suicide notes or talking about guns, knives, or pills. Keep the numbers for these national suicide hotlines: 1-134-577-TALK (7-876.879.3821) and 3-324-YQOAFVH (1-704.659.6217). If you or someone you know talks about suicide or feeling hopeless, get help right away.     Constipation and Hemorrhoids  Drink plenty of fluids, enough so that your urine is light yellow or clear like water. If you have kidney, heart, or liver disease and have to limit fluids, talk with your doctor before you increase the amount of fluids you drink. Eat plenty of fiber each day. Have a bran muffin or bran cereal for breakfast, and try eating a piece of fruit for a mid-afternoon snack. For painful, itchy hemorrhoids, put ice or a cold pack on the area several times a day for 10 minutes at a time. Follow this by putting a warm compress on the area for another 10 to 20 minutes or by sitting in a shallow, warm bath. When should you call for help? Call 911 anytime you think you may need emergency care. For example, call if:  You are thinking of hurting yourself, your baby, or anyone else. You passed out (lost consciousness). You have symptoms of a blood clot in your lung (called a pulmonary embolism). These may include:  Sudden chest pain. Trouble breathing. Coughing up blood. Call your doctor now or seek immediate medical care if:  You have severe vaginal bleeding. You are soaking through a pad each hour for 2 or more hours. Your vaginal bleeding seems to be getting heavier or is still bright red 4 days after delivery. You are dizzy or lightheaded, or you feel like you may faint. You are vomiting or cannot keep fluids down. You have a fever. You have new or more belly pain. You pass tissue (not just blood). Your vaginal discharge smells bad. Your belly feels tender or full and hard. Your breasts are continuously painful or red. You feel sad, anxious, or hopeless for more than a few days. You have sudden, severe pain in your belly. You have symptoms of a blood clot in your leg (called a deep vein thrombosis), such as:  Pain in your calf, back of the knee, thigh, or groin. Redness and swelling in your leg or groin. You have symptoms of preeclampsia, such as:  Sudden swelling of your face, hands, or feet.   New vision problems (such as dimness or blurring). A severe headache. Your blood pressure is higher than it should be or rises suddenly. You have new nausea or vomiting. Watch closely for changes in your health, and be sure to contact your doctor if you have any problems. Additional Information:  Postpartum Support    PARENTS:  Are you feeling sad or depressed? Is it difficult for you to enjoy yourself? Do you feel more irritable or tense? Do you feel anxious or panicky? Are you having difficulty bonding with your baby? Do you feel as if you are \"out of control\" or \"going crazy\"? Are you worried that you might hurt your baby or yourself? FAMILIES: Do you worry that something is wrong but don't know how to help? Do you think that your partner or spouse is having problems coping? Are you worried that it may never get better? While many women experience some mild mood change or \"the blues\" during or after the birth of a child, 1 in 9 women experience more significant symptoms of depression or anxiety. 1 in 10 Dads become depressed during the first year. Things you can do  Being a good parent includes taking care of yourself. If you take care of yourself, you will be able to take better care of your baby and your family. Talk to a counselor or healthcare provider who has training in  mood and anxiety problems. Learn as much as you can about pregnancy and postpartum depression and anxiety. Get support from family and friends. Ask for help when you need it. Join a support group in your area or online. Keep active by walking, stretching or whatever form of exercise helps you to feel better. Get enough rest and time for yourself. Eat a healthy diet. Don't give up! It may take more than one try to get the right help you need.      These are general instructions for a healthy lifestyle:    No smoking/ No tobacco products/ Avoid exposure to second hand smoke    Surgeon General's Warning:  Quitting smoking now greatly reduces serious risk to your health. Obesity, smoking, and sedentary lifestyle greatly increases your risk for illness    A healthy diet, regular physical exercise & weight monitoring are important for maintaining a healthy lifestyle    Recognize signs and symptoms of STROKE:    F-face looks uneven    A-arms unable to move or move unevenly    S-speech slurred or non-existent    T-time-call 911 as soon as signs and symptoms begin - DO NOT go       back to bed or wait to see if you get better - TIME IS BRAIN. I have had the opportunity to make my options or choices for discharge. I have received and understand these instructions.

## 2022-12-23 VITALS
RESPIRATION RATE: 16 BRPM | WEIGHT: 190 LBS | DIASTOLIC BLOOD PRESSURE: 63 MMHG | HEART RATE: 98 BPM | SYSTOLIC BLOOD PRESSURE: 98 MMHG | BODY MASS INDEX: 32.44 KG/M2 | HEIGHT: 64 IN | OXYGEN SATURATION: 98 % | TEMPERATURE: 98 F

## 2022-12-23 PROCEDURE — 74011250637 HC RX REV CODE- 250/637: Performed by: OBSTETRICS & GYNECOLOGY

## 2022-12-23 RX ADMIN — IBUPROFEN 800 MG: 400 TABLET, FILM COATED ORAL at 13:35

## 2022-12-23 NOTE — PROGRESS NOTES
Post-Partum Day Number 2 Progress Note    Yolis Lockett     Assessment: Doing well, post partum day 2  GDMA1 - will need 2hr GTT at 6wk PP visit    Plan:   - Discharge home today  - Follow up in office in 6 week(s) with [facility].  - Pain medication prescription(s) sent. - Questions answered. Information for the patient's :  Armando Monroe [[id number]]   Vaginal, Spontaneous  Patient doing well without significant complaint. Voiding without difficulty, normal lochia. Ready for discharge home. Vitals:  Visit Vitals  BP 98/63 (BP 1 Location: Right upper arm, BP Patient Position: At rest)   Pulse 98   Temp 98 °F (36.7 °C)   Resp 16   Ht 5' 4\" (1.626 m)   Wt 86.2 kg (190 lb)   SpO2 98%   Breastfeeding Unknown   BMI 32.61 kg/m²     Temp (24hrs), Av.8 °F (36.6 °C), Min:97.5 °F (36.4 °C), Max:98 °F (36.7 °C)      Exam:        Patient without distress. Fundus firm, nontender per nursing fundal checks                Perineum with normal lochia noted per nursing assessment                Lower extremities are negative for pathological edema    Labs:     Lab Results   Component Value Date/Time    WBC 10.8 2022 10:20 PM    HGB 11.8 2022 10:20 PM    HCT 35.5 2022 10:20 PM    PLATELET 800  10:20 PM       No results found for this or any previous visit (from the past 24 hour(s)).

## 2022-12-23 NOTE — PROGRESS NOTES
I have reviewed discharge instructions with the patient and spouse. The patient and spouse verbalized understanding. Provided patient with letter from MD for insurance purposes stating that we only offer private rooms on our unit. Signed copy of d/c instructions on paper chart.

## 2022-12-23 NOTE — PROGRESS NOTES
Bedside shift change report given to Boyd Aquino RN (oncoming nurse) by Ariana Trejo RN (offgoing nurse). Report included the following information SBAR.

## 2022-12-23 NOTE — PROGRESS NOTES
To Whom it May Concern;    Ms. Stefani Lewis was admitted to [facility] for routine obstetric care. This facility only has private L&D and postpartum rooms, no semi-private rooms are available.         Zoë Gil MD  [facility]  [phone]

## 2022-12-23 NOTE — LACTATION NOTE
This note was copied from a baby's chart. Mom states she has been having varied success getting baby latched and nursing. She said she used the nipple shield during the night and had a good feeding. The last couple feedings did not go as well. Baby was not staying latched and nursing consistently. Mom and baby are scheduled for discharge today. I gave mom a hand pump to use and she has her own electric breast pump at home. I recommended that she pump after nursing during the day to increase the breast stimulation and any milk collected will be given to the baby. Breast feeding teaching completed and all questions answered.

## 2022-12-23 NOTE — ROUTINE PROCESS
Bedside and Verbal shift change report given to GRACIE Ortiz (oncoming nurse) by Tanya Joaquin (offgoing nurse). Report included the following information SBAR.